# Patient Record
Sex: FEMALE | Race: BLACK OR AFRICAN AMERICAN | NOT HISPANIC OR LATINO | Employment: UNEMPLOYED | URBAN - METROPOLITAN AREA
[De-identification: names, ages, dates, MRNs, and addresses within clinical notes are randomized per-mention and may not be internally consistent; named-entity substitution may affect disease eponyms.]

---

## 2020-02-06 PROBLEM — M94.0 COSTOCHONDRITIS: Status: ACTIVE | Noted: 2020-02-06

## 2021-09-14 PROBLEM — K43.2 INCISIONAL HERNIA, WITHOUT OBSTRUCTION OR GANGRENE: Status: ACTIVE | Noted: 2021-09-14

## 2021-09-22 PROBLEM — K42.9 UMBILICAL HERNIA WITHOUT OBSTRUCTION AND WITHOUT GANGRENE: Status: ACTIVE | Noted: 2021-09-22

## 2022-01-14 PROBLEM — F41.1 GENERALIZED ANXIETY DISORDER: Status: ACTIVE | Noted: 2022-01-14

## 2022-01-28 PROBLEM — G89.29 CHRONIC LEFT SHOULDER PAIN: Chronic | Status: ACTIVE | Noted: 2022-01-28

## 2022-01-28 PROBLEM — E66.9 OBESITY (BMI 35.0-39.9 WITHOUT COMORBIDITY): Chronic | Status: ACTIVE | Noted: 2022-01-28

## 2022-01-28 PROBLEM — M54.6 CHRONIC MIDLINE THORACIC BACK PAIN: Chronic | Status: ACTIVE | Noted: 2022-01-28

## 2022-01-28 PROBLEM — G89.29 CHRONIC MIDLINE THORACIC BACK PAIN: Chronic | Status: ACTIVE | Noted: 2022-01-28

## 2022-01-28 PROBLEM — M25.512 CHRONIC LEFT SHOULDER PAIN: Chronic | Status: ACTIVE | Noted: 2022-01-28

## 2023-03-20 PROBLEM — N28.89 RENAL MASS, LEFT: Status: ACTIVE | Noted: 2023-03-20

## 2023-03-21 PROBLEM — G43.A0 CYCLICAL VOMITING ASSOCIATED WITH MIGRAINE: Status: ACTIVE | Noted: 2023-03-21

## 2023-03-21 PROBLEM — N15.1 RENAL ABSCESS: Status: ACTIVE | Noted: 2023-03-20

## 2023-03-21 PROBLEM — N39.0 UTI (URINARY TRACT INFECTION), BACTERIAL: Status: ACTIVE | Noted: 2023-03-21

## 2023-03-21 PROBLEM — T78.40XA ALLERGIC REACTION: Status: ACTIVE | Noted: 2023-03-21

## 2023-03-21 PROBLEM — A49.9 UTI (URINARY TRACT INFECTION), BACTERIAL: Status: ACTIVE | Noted: 2023-03-21

## 2023-03-23 PROBLEM — R10.12 LEFT UPPER QUADRANT ABDOMINAL PAIN: Status: ACTIVE | Noted: 2023-03-23

## 2023-04-11 ENCOUNTER — PATIENT MESSAGE (OUTPATIENT)
Dept: RESEARCH | Facility: HOSPITAL | Age: 38
End: 2023-04-11
Payer: MEDICAID

## 2023-06-26 PROBLEM — N39.0 UTI (URINARY TRACT INFECTION), BACTERIAL: Status: RESOLVED | Noted: 2023-03-21 | Resolved: 2023-06-26

## 2023-06-26 PROBLEM — A49.9 UTI (URINARY TRACT INFECTION), BACTERIAL: Status: RESOLVED | Noted: 2023-03-21 | Resolved: 2023-06-26

## 2024-11-19 PROBLEM — K21.9 GASTROESOPHAGEAL REFLUX DISEASE: Status: ACTIVE | Noted: 2024-11-19

## 2024-11-19 PROBLEM — N15.1 RENAL ABSCESS: Status: RESOLVED | Noted: 2023-03-20 | Resolved: 2024-11-19

## 2024-11-19 PROBLEM — Z34.91 INITIAL OBSTETRIC VISIT IN FIRST TRIMESTER: Status: ACTIVE | Noted: 2024-11-19

## 2024-11-19 PROBLEM — Z86.69 HISTORY OF SEIZURE DISORDER: Status: ACTIVE | Noted: 2024-11-19

## 2024-11-19 PROBLEM — O09.32 INITIAL OBSTETRIC VISIT IN SECOND TRIMESTER: Status: ACTIVE | Noted: 2024-11-19

## 2024-11-19 PROBLEM — Z87.81 HISTORY OF FOOT FRACTURE: Status: ACTIVE | Noted: 2024-11-19

## 2024-11-19 PROBLEM — S02.609A CLOSED FRACTURE OF MANDIBLE: Status: ACTIVE | Noted: 2024-11-19

## 2024-11-20 ENCOUNTER — SOCIAL WORK (OUTPATIENT)
Dept: ADMINISTRATIVE | Facility: OTHER | Age: 39
End: 2024-11-20
Payer: MEDICAID

## 2024-11-20 ENCOUNTER — OUTPATIENT CASE MANAGEMENT (OUTPATIENT)
Dept: ADMINISTRATIVE | Facility: OTHER | Age: 39
End: 2024-11-20
Payer: MEDICAID

## 2024-11-20 NOTE — PROGRESS NOTES
Outpatient Care Management  Patient Does Not Consent    Patient: Dennise Christopher  MRN:  [id number]  Date of Service:  12/3/2024  Completed by:  JESSE Zarate    No chief complaint on file.      Patient Summary           Consent Received:  Decline  Decline Reason:  Not interested   received a referral from outpatient provider for the following Low/Mod SW psychosocial needs initial ob assessment. SW met with pt regarding initial ob assessment on 11/19/2024.Pt stated this is her 7th pregnancy/0-miscarriage.Pt stated lives alone with her children-15,13,11,10,8. Pt stated does work. Pt stated support system is her brother/Carlos. Pt stated does have medicaid. Pt stated not going to apply for WIC.Pt stated not going to breastfeed.SW completed pt's notification of pregnancy and faxed/scanned into epic. No other needs identified at this time.

## 2024-11-23 PROBLEM — O09.529 AMA (ADVANCED MATERNAL AGE) MULTIGRAVIDA 35+: Status: ACTIVE | Noted: 2024-11-23

## 2024-12-03 PROBLEM — Z98.890 S/P DILATATION AND CURETTAGE: Status: ACTIVE | Noted: 2024-12-03

## 2025-04-23 PROBLEM — O26.899: Status: ACTIVE | Noted: 2025-04-23

## 2025-04-23 PROBLEM — R10.9: Status: ACTIVE | Noted: 2025-04-23

## 2025-05-15 PROBLEM — O9A.219 TRAUMA DURING PREGNANCY: Status: ACTIVE | Noted: 2025-05-15

## 2025-05-22 ENCOUNTER — PATIENT MESSAGE (OUTPATIENT)
Dept: ADMINISTRATIVE | Facility: OTHER | Age: 40
End: 2025-05-22
Payer: MEDICAID

## 2025-05-29 ENCOUNTER — PATIENT MESSAGE (OUTPATIENT)
Dept: ADMINISTRATIVE | Facility: OTHER | Age: 40
End: 2025-05-29
Payer: MEDICAID